# Patient Record
Sex: FEMALE | Race: OTHER
[De-identification: names, ages, dates, MRNs, and addresses within clinical notes are randomized per-mention and may not be internally consistent; named-entity substitution may affect disease eponyms.]

---

## 2019-09-18 ENCOUNTER — HOSPITAL ENCOUNTER (EMERGENCY)
Dept: HOSPITAL 72 - EMR | Age: 34
Discharge: HOME | End: 2019-09-18
Payer: MEDICAID

## 2019-09-18 VITALS — DIASTOLIC BLOOD PRESSURE: 63 MMHG | SYSTOLIC BLOOD PRESSURE: 118 MMHG

## 2019-09-18 VITALS — BODY MASS INDEX: 25.61 KG/M2 | WEIGHT: 150 LBS | HEIGHT: 64 IN
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
## 2019-09-18 DIAGNOSIS — S39.012A: Primary | ICD-10-CM

## 2019-09-18 LAB
APPEARANCE UR: CLEAR
APTT PPP: (no result) S
GLUCOSE UR STRIP-MCNC: NEGATIVE MG/DL
KETONES UR QL STRIP: NEGATIVE
LEUKOCYTE ESTERASE UR QL STRIP: NEGATIVE
NITRITE UR QL STRIP: NEGATIVE
PH UR STRIP: 7 [PH] (ref 4.5–8)
PROT UR QL STRIP: NEGATIVE
SP GR UR STRIP: 1 (ref 1–1.03)
UROBILINOGEN UR-MCNC: NORMAL MG/DL (ref 0–1)

## 2019-09-18 PROCEDURE — 96372 THER/PROPH/DIAG INJ SC/IM: CPT

## 2019-09-18 PROCEDURE — 72020 X-RAY EXAM OF SPINE 1 VIEW: CPT

## 2019-09-18 PROCEDURE — 99283 EMERGENCY DEPT VISIT LOW MDM: CPT

## 2019-09-18 PROCEDURE — 81025 URINE PREGNANCY TEST: CPT

## 2019-09-18 PROCEDURE — 81003 URINALYSIS AUTO W/O SCOPE: CPT

## 2019-09-18 NOTE — NUR
ED Nurse Note:pt. came with c/o lower back pain after lifting heavy objects at 
work, urine sent to labs

## 2019-09-18 NOTE — DIAGNOSTIC IMAGING REPORT
Indication: Back pain

 

Technique: XRAY [facility]

 

Comparison: None

 

Findings: There is 6 nonrib-bearing lumbar-type vertebral bodies, assuming 12 paired

ribs. No acute fractures identified. Lumbar lordosis is maintained, without evidence

of spondylolisthesis. Vertebral body heights and disc spaces are maintained. Bowel

gas pattern is unremarkable. No radiopaque foreign body.

 

Impression: 

No evidence of acute fracture or traumatic malalignment.

 

Transitional lumbosacral anatomy.

## 2019-09-18 NOTE — EMERGENCY ROOM REPORT
History of Present Illness


General


Chief Complaint:  Lower Back Pain or Injury


Source:  Patient





Present Illness


HPI


The patient states that she injured her low back 1 week ago.  She states that 

she was at work and was lifting heavy objects and felt pain in her back at the 

time.  She states that she has midline back pain since that time.  She states 

the pain is worse when she is laying down and she has difficulty getting up 

from a laying down position.  She denies weakness.  She denies tingling or 

numbness.  She denies loss of bowel or bladder control.  She denies fever or 

chills.  She denies recent illness.  She denies dysuria or hematuria.  She has 

no other complaints.


Allergies:  


Coded Allergies:  


     No Known Allergies (Unverified , 9/18/19)





Patient History


Past Medical History:  none


Social History:  Denies: smoking, alcohol use, drug use


Last Menstrual Period:  09/15/19


Reviewed Nursing Documentation:  PMH: Agreed; PSxH: Agreed





Nursing Documentation-PMH


Past Medical History:  No Stated History





Review of Systems


All Other Systems:  negative except mentioned in HPI





Physical Exam





Vital Signs








  Date Time  Temp Pulse Resp B/P (MAP) Pulse Ox O2 Delivery O2 Flow Rate FiO2


 


9/18/19 09:51 98.1 73 18 118/63 (81) 99 Room Air  








Sp02 EP Interpretation:  reviewed, normal


General Appearance:  no apparent distress, alert, GCS 15, non-toxic


Head:  normocephalic, atraumatic


Eyes:  bilateral eye normal inspection, bilateral eye PERRL


ENT:  hearing grossly normal, no angioedema, normal voice


Neck:  normal inspection, full range of motion, supple/symm/no masses


Respiratory:  no respiratory distress, no retraction, no accessory muscle use, 

speaking full sentences


Rectal:  deferred


Musculoskeletal:  gait/station normal, normal range of motion, tender - Tender 

along the midline L-spine.


Neurologic:  alert, oriented x3, responsive, motor strength/tone normal, 

sensory intact, speech normal


Psychiatric:  judgement/insight normal, memory normal, mood/affect normal, no 

suicidal/homicidal ideation


Skin:  no rash, normal color





Medical Decision Making


Diagnostic Impression:  


 Primary Impression:  


 Low back strain


ER Course


This patient has a clinical presentation consistent with mechanical back pain 

secondary to Low back strain.  There are no red flags on physical exam.  The 

patient denies any concerning features such as trauma, fevers, night sweats, 

history of malignancy, pain worse at night, IV drug abuse, urinary/fecal 

incontinence or retention, focal weakness or change in sensation, or refractory 

pain.  Given these pertinent negatives in the history and physical exam an 

emergent cause of the back pain such as epidural abscess, metastasis to bone, 

cauda equina syndrome, and fracture is less likely.  I also doubt emergent 

cardiovascular cause of back pain such as aortic dissection a ruptured 

abdominal aortic aneurysm given patient with equal pulses in all 4 extremities 

with no diastolic murmur or pulsatile abdominal mass.  L-spine x-ray was 

obtained and shows no significant findings.  The patient was counseled that, 

though unlikely, the possibility of an emergent cause of back pain may still be 

present and that the patient should return immediately if symptoms persist or 

worsen.  The symptoms are reproducible with movement.  Patient had a benign 

evaluation and neurologic examination.  No emergency etiology was identified.





Laboratory Tests








Test


  9/18/19


10:00


 


Urine Color Pale yellow  


 


Urine Appearance Clear  


 


Urine pH 7 (4.5-8.0)  


 


Urine Specific Gravity


  1.005


(1.005-1.035)


 


Urine Protein


  Negative


(NEGATIVE)


 


Urine Glucose (UA)


  Negative


(NEGATIVE)


 


Urine Ketones


  Negative


(NEGATIVE)


 


Urine Blood


  Negative


(NEGATIVE)


 


Urine Nitrite


  Negative


(NEGATIVE)


 


Urine Bilirubin


  Negative


(NEGATIVE)


 


Urine Urobilinogen


  Normal MG/DL


(0.0-1.0)


 


Urine Leukocyte Esterase


  Negative


(NEGATIVE)


 


Urine HCG, Qualitative


  Negative


(NEGATIVE)








Other X-Ray Diagnostic Results


Other X-Ray Diagnostic Results :  


   X-Ray ordered:  L-spine xray


   # of Views/Limited Vs Complete:  Complete


   Indication:  Pain


   EP Interpretation:  Yes


   Interpretation:  no fractures


   Impression:  No acute disease


   Electronically Signed by:  Brook Rodriguez DO





Last Vital Signs








  Date Time  Temp Pulse Resp B/P (MAP) Pulse Ox O2 Delivery O2 Flow Rate FiO2


 


9/18/19 09:51 98.1 73 18 118/63 (81) 99 Room Air  








Status:  improved


Disposition:  HOME, SELF-CARE


Condition:  Improved


Scripts


No Active Prescriptions or Reported Meds


Patient Instructions:  Low Back Sprain With Rehab-[facility], Lumbosacral Strain











Brook Rodriguez DO Sep 18, 2019 10:23